# Patient Record
(demographics unavailable — no encounter records)

---

## 2017-10-04 NOTE — RAD
CHEST PA   LATERAL



Clinical Indication: chills with elevated wbc, concern for pneumonia



Comparison: Chest radiograph dated 7/11/2015



Findings: 

Interval placement of left pacemaker with leads in the right atrium and right

ventricle. Normal lung volume. No focal consolidation. Normal pulmonary

vasculature. No pleural effusion or pneumothorax. The cardiomediastinal

silhouette is normal. Stable tortuous thoracic aorta. Calcified mediastinal

lymph nodes. No acute osseous abnormality. Moderate multilevel degenerative

changes of the visualized spine.



IMPRESSION:



1. No acute cardiopulmonary process.

2. New left pacemaker as above.

## 2017-10-04 NOTE — EKG
[facility] [address], [place] [address]

Test Date:    2017-10-04               Test Time:    14:32:03

Pat Name:     SILVINO REYNOSO               Department:   

Patient ID:   [id number]           Room:          

Gender:       F                        Technician:   BOSTON

:          1930               Requested By: LOUISA RASCON

Order Number: [id number]            Reading MD:     

                                 Measurements

Intervals                              Axis          

Rate:         76                       P:            19

OK:           160                      QRS:          -21

QRSD:         94                       T:            56

QT:           386                                    

QTc:          439                                    

                           Interpretive Statements

SINUS RHYTHM

ATRIAL PREMATURE COMPLEX(ES)

LEFTWARD AXIS

OTHERWISE NORMAL ECG

RI6.01

No previous ECG available for comparison

## 2017-10-04 NOTE — PHYS DOC
Past History


Past Medical History:  Diabetes, High Cholesterol, Other


Past Surgical History:  Colectomy, Hysterectomy, Oophorectomy, Pacemaker


Alcohol Use:  None


Drug Use:  None





Adult General


Chief Complaint


Chief Complaint:  nausea





HPI


HPI


87-year-old female presenting to the emergency department today with nausea. 

She reports her nausea started about 2 days ago. She denies any vomiting or 

pain. She denies chest pain or abdominal pain. She complains of chills but 

denies having fevers at home. She denies numbness weakness or tingling vision 

changes difficulty talking or walking.





Review of systems is negative for fevers chills neck stiffness confusion 

cyanosis lethargy chest pain shortness of breath abdominal pain vomiting 

diarrhea constipation. All other review of systems is negative unless otherwise 

noted in history of present illness.





ED course: 87-year-old female presenting to the emergency department today with 

nausea. Triage vital signs afebrile with a normal heart rate and blood 

pressure. Saturating well on room air. Patient is comfortable with on 

examination. Nontender abdomen clear lungs to auscultation. Otherwise normal 

neurologic exam. There are no rashes present on the patient's skin. Based on 

the patient's symptoms we aishwarya some blood work and reviewed it which showed 

evidence of a urinary tract infection. Mild leukocytosis present. This explains 

most of the patient's symptoms and is consistent with patient's clinical 

presentation. The patient was initiated on antibiotic therapy. The patient was 

then discharged home in stable condition to follow up with their primary care 

physician over the next 2-3 days. They were to return if their symptoms 

worsened or if they were concerned for any reason. Face-to-face discharge 

instructions and return precautions were given. Patient's questions were 

answered to their satisfaction. Patient is comfortable plan.





Review of Systems


Review of Systems


SEE ABOVE.





Allergies


Allergies





Allergies








Coded Allergies Type Severity Reaction Last Updated Verified


 


  aspirin Allergy Intermediate  12/10/14 Yes


 


  I S O L A T I O N *CONTACT* Allergy Unknown  7/13/15 No











Physical Exam


Physical Exam


SEE ABOVE





Constitutional: Well developed, well nourished, no acute distress, non-toxic 

appearance. []


HENT: Normocephalic, atraumatic, bilateral external ears normal, oropharynx 

moist, no oral exudates, nose normal. []


Eyes: PERRLA, EOMI, conjunctiva normal, no discharge. [] 


Neck: Normal range of motion, no tenderness, supple, no stridor. [] 


Cardiovascular:Heart rate regular rhythm, no murmur []


Lungs & Thorax:  Bilateral breath sounds clear to auscultation []


Abdomen: Bowel sounds normal, soft, no tenderness, no masses, no pulsatile 

masses. [] 


Skin: Warm, dry, no erythema, no rash. [] 


Back: No tenderness, no CVA tenderness. [] 


Extremities: No tenderness, no cyanosis, no clubbing, ROM intact, no edema. [] 


Neurologic: Alert and oriented X 3, normal motor function, normal sensory 

function, no focal deficits noted. []


Psychologic: Affect normal, judgement normal, mood normal. []





Current Patient Data


Vital Signs





 Vital Signs








  Date Time  Temp Pulse Resp B/P (MAP) Pulse Ox O2 Delivery O2 Flow Rate FiO2


 


10/4/17 13:49 98.2 76 18  97 Room Air  








Lab Results





 Laboratory Tests








Test


  10/4/17


14:27


 


White Blood Count


  12.0 x10^3/uL


(4.0-11.0)  H


 


Red Blood Count


  4.06 x10^6/uL


(3.50-5.40)


 


Hemoglobin


  12.9 g/dL


(12.0-15.5)


 


Hematocrit


  36.5 %


(36.0-47.0)


 


Mean Corpuscular Volume


  90 fL ()


 


 


Mean Corpuscular Hemoglobin 32 pg (25-35)  


 


Mean Corpuscular Hemoglobin


Concent 35 g/dL


(31-37)


 


Red Cell Distribution Width


  13.2 %


(11.5-14.5)


 


Platelet Count


  165 x10^3/uL


(140-400)


 


Neutrophils (%) (Auto) 75 % (31-73)  H


 


Lymphocytes (%) (Auto) 11 % (24-48)  L


 


Monocytes (%) (Auto) 14 % (0-9)  H


 


Eosinophils (%) (Auto) 0 % (0-3)  


 


Basophils (%) (Auto) 1 % (0-3)  


 


Neutrophils # (Auto)


  8.9 x10^3uL


(1.8-7.7)  H


 


Lymphocytes # (Auto)


  1.3 x10^3/uL


(1.0-4.8)


 


Monocytes # (Auto)


  1.7 x10^3/uL


(0.0-1.1)  H


 


Eosinophils # (Auto)


  0.0 x10^3/uL


(0.0-0.7)


 


Basophils # (Auto)


  0.1 x10^3/uL


(0.0-0.2)


 


Sodium Level


  137 mmol/L


(136-145)


 


Potassium Level


  3.9 mmol/L


(3.5-5.1)


 


Chloride Level


  103 mmol/L


()


 


Carbon Dioxide Level


  26 mmol/L


(21-32)


 


Anion Gap 8 (6-14)  


 


Blood Urea Nitrogen


  15 mg/dL


(7-20)


 


Creatinine


  1.2 mg/dL


(0.6-1.0)  H


 


Estimated GFR


(Cockcroft-Gault) 42.5  


 


 


Glucose Level


  143 mg/dL


(70-99)  H


 


Calcium Level


  9.2 mg/dL


(8.5-10.1)


 


Total Bilirubin


  1.2 mg/dL


(0.2-1.0)  H


 


Direct Bilirubin


  0.4 mg/dL


(0.0-0.2)  H


 


Aspartate Amino Transferase


(AST) 19 U/L (15-37)


 


 


Alanine Aminotransferase (ALT)


  22 U/L (14-59)


 


 


Alkaline Phosphatase


  80 U/L


()


 


Troponin I Quantitative


  < 0.017 ng/mL


(0-0.055)


 


Total Protein


  7.4 g/dL


(6.4-8.2)


 


Albumin


  3.7 g/dL


(3.4-5.0)


 


Lipase


  102 U/L


()











EKG


EKG


EKG reviewed by myself shows sinus rhythm with a regular rate. ST segments 

congruent. Not suggestive of ACS. Mildly leftward axis present.[]





Radiology/Procedures


Radiology/Procedures


[]





Course & Med Decision Making


Course & Med Decision Making


Pertinent Labs and Imaging studies reviewed. (See chart for details)





[]





Dragon Disclaimer


Dragon Disclaimer


This chart was dictated in whole or in part using Voice Recognition software in 

a busy, high-work load, and often noisy Emergency Department environment.  It 

may contain unintended and wholly unrecognized errors or omissions.





Departure


Departure:


Impression:  


 Primary Impression:  


 Nausea


 Additional Impressions:  


 Urinary tract infection


 Chills


Disposition:  01 HOME, SELF-CARE


Condition:  STABLE


Referrals:  


DOUG SINHA MD (PCP)


Patient Instructions:  Nausea, Adult





Additional Instructions:  


Thank you for allowing us to participate in your care today.





Followup with your primary care physician in 3 days if your symptoms do not 

improve.  Call your Primary Doctor tomorrow and inform them of your visit 

today.  If you do not have a primary care provider you can ask for a list of 

our primary care providers. Return to the emergency department you have any new 

or concerning findings.





This should be evaluated by the primary care physician and any necessary 

consulting services for continued management within a few days after discharge. 

Return to emergency room if you have any  new or concerning symptoms including 

but not limited to fever, chills, nausea, vomiting, intractable pain, any new 

rashes, chest pain, shortness of air, uncontrolled bleeding, difficulty 

breathing, and/or vision loss.


Scripts


Nitrofurantoin Monohyd/M-Cryst (MACROBID 100 MG CAPSULE) 100 Mg Capsule


1 CAP PO BID, #10 CAP


   Prov: LOUISA RASCON MD         10/4/17 


Ondansetron Hcl (ZOFRAN) 4 Mg Tablet


1 TAB PO PRN Q6HRS Y for NAUSEA, #6 TAB


   Prov: LOUISA RASCON MD         10/4/17





Problem Qualifiers











LOUISA RASCON MD Oct 4, 2017 15:10